# Patient Record
Sex: MALE | Race: WHITE | NOT HISPANIC OR LATINO | Employment: STUDENT | URBAN - METROPOLITAN AREA
[De-identification: names, ages, dates, MRNs, and addresses within clinical notes are randomized per-mention and may not be internally consistent; named-entity substitution may affect disease eponyms.]

---

## 2023-08-21 PROBLEM — R22.0 HEAD LUMP: Status: ACTIVE | Noted: 2023-08-21

## 2023-08-21 PROBLEM — M79.676 TOE PAIN: Status: ACTIVE | Noted: 2023-08-21

## 2023-08-21 PROBLEM — D72.819 LEUKOPENIA: Status: ACTIVE | Noted: 2023-08-21

## 2023-08-21 PROBLEM — F90.9 ATTENTION-DEFICIT/HYPERACTIVITY DISORDER: Status: ACTIVE | Noted: 2023-08-21

## 2023-08-21 PROBLEM — F84.0 AUTISM (HHS-HCC): Status: ACTIVE | Noted: 2023-08-21

## 2023-08-21 PROBLEM — R63.4 WEIGHT LOSS: Status: ACTIVE | Noted: 2023-08-21

## 2023-08-21 PROBLEM — B35.4 TINEA CORPORIS: Status: ACTIVE | Noted: 2023-08-21

## 2023-08-21 PROBLEM — F41.9 ANXIETY DISORDER: Status: ACTIVE | Noted: 2023-08-21

## 2023-08-21 PROBLEM — R19.7 DIARRHEA: Status: ACTIVE | Noted: 2023-08-21

## 2023-08-21 PROBLEM — B36.0 TINEA VERSICOLOR: Status: ACTIVE | Noted: 2023-08-21

## 2023-08-21 PROBLEM — R62.50 DEVELOPMENT DELAY: Status: ACTIVE | Noted: 2023-08-21

## 2023-08-21 RX ORDER — QUETIAPINE FUMARATE 100 MG/1
1 TABLET, FILM COATED ORAL DAILY
COMMUNITY
Start: 2022-01-31 | End: 2024-06-03 | Stop reason: WASHOUT

## 2023-08-21 RX ORDER — TRIAMCINOLONE ACETONIDE 5 MG/G
CREAM TOPICAL
COMMUNITY

## 2023-08-21 RX ORDER — METHYLPHENIDATE HYDROCHLORIDE 30 MG/1
2 CAPSULE, EXTENDED RELEASE ORAL DAILY
COMMUNITY
Start: 2022-10-03 | End: 2024-04-16 | Stop reason: SDUPTHER

## 2023-08-21 RX ORDER — QUETIAPINE FUMARATE 200 MG/1
2 TABLET, FILM COATED ORAL DAILY
COMMUNITY
Start: 2019-12-23 | End: 2024-04-18 | Stop reason: WASHOUT

## 2023-08-21 RX ORDER — BENZOYL PEROXIDE 50 MG/ML
LIQUID TOPICAL
COMMUNITY

## 2023-10-02 ENCOUNTER — TELEMEDICINE (OUTPATIENT)
Dept: BEHAVIORAL HEALTH | Facility: CLINIC | Age: 18
End: 2023-10-02
Payer: COMMERCIAL

## 2023-10-02 DIAGNOSIS — F90.2 ATTENTION DEFICIT HYPERACTIVITY DISORDER (ADHD), COMBINED TYPE: ICD-10-CM

## 2023-10-02 DIAGNOSIS — F34.89 OTHER SPECIFIED PERSISTENT MOOD DISORDERS (CMS-HCC): ICD-10-CM

## 2023-10-02 PROCEDURE — 99214 OFFICE O/P EST MOD 30 MIN: CPT | Performed by: PSYCHIATRY & NEUROLOGY

## 2023-10-02 RX ORDER — METHYLPHENIDATE HYDROCHLORIDE 60 MG/1
60 CAPSULE, EXTENDED RELEASE ORAL EVERY MORNING
Qty: 30 CAPSULE | Refills: 0 | Status: SHIPPED | OUTPATIENT
Start: 2023-12-01 | End: 2024-01-29

## 2023-10-02 RX ORDER — METHYLPHENIDATE HYDROCHLORIDE 60 MG/1
60 CAPSULE, EXTENDED RELEASE ORAL EVERY MORNING
Qty: 30 CAPSULE | Refills: 0 | Status: SHIPPED | OUTPATIENT
Start: 2023-11-01 | End: 2024-04-16 | Stop reason: SDUPTHER

## 2023-10-02 RX ORDER — QUETIAPINE FUMARATE 100 MG/1
100 TABLET, FILM COATED ORAL DAILY
Qty: 90 TABLET | Refills: 1 | Status: SHIPPED | OUTPATIENT
Start: 2023-10-02 | End: 2024-03-15

## 2023-10-02 RX ORDER — QUETIAPINE FUMARATE 200 MG/1
200 TABLET, FILM COATED ORAL NIGHTLY
Qty: 90 TABLET | Refills: 1 | Status: SHIPPED | OUTPATIENT
Start: 2023-10-02 | End: 2024-04-16 | Stop reason: SDUPTHER

## 2023-10-02 RX ORDER — METHYLPHENIDATE HYDROCHLORIDE 60 MG/1
60 CAPSULE, EXTENDED RELEASE ORAL EVERY MORNING
Qty: 30 CAPSULE | Refills: 0 | Status: SHIPPED | OUTPATIENT
Start: 2023-10-02 | End: 2024-06-03 | Stop reason: SDUPTHER

## 2023-10-02 RX ORDER — TRAZODONE HYDROCHLORIDE 50 MG/1
50 TABLET ORAL NIGHTLY
Qty: 30 TABLET | Refills: 2 | Status: SHIPPED | OUTPATIENT
Start: 2023-10-02 | End: 2023-12-29

## 2023-10-02 NOTE — PROGRESS NOTES
"      Patient seen by me, medications and chart history reviewed.     Subjective: (reported issues and events over the last 24 hours)   Mom getting  - some stress related to that  Pt. Doing well despite stressors.  Only taking 1 of the 200 mg of seroquel most nights - reports its better (occasionally reporting \"seeing people\" - grandmother who passed away; doesn't act on those things)  Unisom somewhat helpful with sleep  Mom takes trazodone - helpful for her chronic insomnia  Some more anxiety in morning/afternoon - may be related to time of year    Objective:     Virtual visit    Unable to see pt. - pt. At school work program    Lab Results:   Results for orders placed or performed in visit on 04/08/22   Drug Screen, Urine With Reflex to Confirmation   Result Value Ref Range    DRUG SCREEN COMMENT URINE SEE BELOW     Amphetamine Screen, Urine PRESUMPTIVE NEGATIVE NEGATIVE    Barbiturate Screen, Urine PRESUMPTIVE NEGATIVE NEGATIVE    BENZODIAZEPINE (PRESENCE) IN URINE BY SCREEN METHOD PRESUMPTIVE NEGATIVE NEGATIVE    Cannabinoid Screen, Urine PRESUMPTIVE NEGATIVE NEGATIVE    Cocaine Screen, Urine PRESUMPTIVE NEGATIVE NEGATIVE    Fentanyl, Ur PRESUMPTIVE NEGATIVE NEGATIVE    Methadone Screen, Urine PRESUMPTIVE NEGATIVE NEGATIVE    Opiate Screen, Urine PRESUMPTIVE NEGATIVE NEGATIVE    Oxycodone Screen, Ur PRESUMPTIVE NEGATIVE NEGATIVE    PCP Screen, Urine PRESUMPTIVE NEGATIVE NEGATIVE       Medications:   Current Outpatient Medications   Medication Sig Dispense Refill    benzoyl peroxide 5 % external wash Apply topically.      methylphenidate CD (Metadate CD) 30 mg daily capsule Take 2 capsules (60 mg) by mouth once daily.      QUEtiapine (SEROquel) 100 mg tablet Take 1 tablet (100 mg) by mouth once daily.      QUEtiapine (SEROquel) 200 mg tablet Take 2 tablets (400 mg) by mouth once daily.      triamcinolone (Kenalog) 0.5 % cream Apply topically.       No current facility-administered medications for this " "visit.        Diagnosis/Assessment/Plan:   Dx: ADHD  Austim, requires support  Anxiety/unspecified  Medication Issues: No ADRs   - cont. MPH CD 60 mg (tried \"new dose\" 60 mg) - if not, do 30 mg 2 tabs daily  - add trazodone 50 mg bedtime  - cont. Seroquel 100 mg AM and 200 mg bedtime            "

## 2024-01-25 DIAGNOSIS — F90.2 ATTENTION DEFICIT HYPERACTIVITY DISORDER (ADHD), COMBINED TYPE: ICD-10-CM

## 2024-01-26 ENCOUNTER — TELEPHONE (OUTPATIENT)
Dept: BEHAVIORAL HEALTH | Facility: CLINIC | Age: 19
End: 2024-01-26
Payer: COMMERCIAL

## 2024-01-26 DIAGNOSIS — F90.2 ATTENTION DEFICIT HYPERACTIVITY DISORDER (ADHD), COMBINED TYPE: ICD-10-CM

## 2024-01-29 RX ORDER — METHYLPHENIDATE HYDROCHLORIDE 60 MG/1
60 CAPSULE, EXTENDED RELEASE ORAL EVERY MORNING
Qty: 90 CAPSULE | Refills: 0 | Status: SHIPPED | OUTPATIENT
Start: 2024-01-29 | End: 2024-06-03 | Stop reason: SDUPTHER

## 2024-03-15 DIAGNOSIS — F34.89 OTHER SPECIFIED PERSISTENT MOOD DISORDERS (CMS-HCC): ICD-10-CM

## 2024-03-15 RX ORDER — QUETIAPINE FUMARATE 100 MG/1
100 TABLET, FILM COATED ORAL DAILY
Qty: 90 TABLET | Refills: 1 | Status: SHIPPED | OUTPATIENT
Start: 2024-03-15 | End: 2024-04-16 | Stop reason: SDUPTHER

## 2024-03-18 ENCOUNTER — TELEMEDICINE (OUTPATIENT)
Dept: BEHAVIORAL HEALTH | Facility: CLINIC | Age: 19
End: 2024-03-18
Payer: COMMERCIAL

## 2024-03-18 DIAGNOSIS — F90.2 ATTENTION DEFICIT HYPERACTIVITY DISORDER (ADHD), COMBINED TYPE: ICD-10-CM

## 2024-03-18 PROCEDURE — 99214 OFFICE O/P EST MOD 30 MIN: CPT | Performed by: PSYCHIATRY & NEUROLOGY

## 2024-03-18 RX ORDER — METHYLPHENIDATE HYDROCHLORIDE 40 MG/1
40 CAPSULE ORAL NIGHTLY
Qty: 30 CAPSULE | Refills: 0 | Status: SHIPPED | OUTPATIENT
Start: 2024-03-18 | End: 2024-04-01

## 2024-03-18 NOTE — PROGRESS NOTES
Patient Name: Kevin    Type of Service: Virtual   Date: 3/18/2024             Kevin presents for follow up for   Chief Complaint   Patient presents with    ADHD    Autism      - more impulsive lately - verbally and then yelling/arguing with mom and grandma  - sleeping well  - working to get pt. Job - board of DD involved, supportive  - grandpa passed away; mom and pt. Have moved in with grandma    [place] reviewed, no concerns noted. Reviewed risks, benefits controlled substances.     - only saw mom    Mental Status Exam:  - only saw mom, pt. Not on appointment      Assessment:  Problem List Items Addressed This Visit          Mental Health    Attention-deficit/hyperactivity disorder    Relevant Medications    methylphenidate HCl (Jornay PM) 40 mg 24 hour capsule    Other Relevant Orders    Follow Up In Pediatric Psychiatry        Plan:  - switch to Jornay to target impulsive ADHD Sx  - cont. Trazodone  - cont. Seroquel for mood stabilization         Next appointment: June in person      
Clear

## 2024-04-01 ENCOUNTER — TELEPHONE (OUTPATIENT)
Dept: BEHAVIORAL HEALTH | Facility: CLINIC | Age: 19
End: 2024-04-01
Payer: COMMERCIAL

## 2024-04-01 DIAGNOSIS — F90.2 ATTENTION DEFICIT HYPERACTIVITY DISORDER (ADHD), COMBINED TYPE: ICD-10-CM

## 2024-04-01 DIAGNOSIS — F90.2 ADHD (ATTENTION DEFICIT HYPERACTIVITY DISORDER), COMBINED TYPE: Primary | ICD-10-CM

## 2024-04-01 RX ORDER — METHYLPHENIDATE HYDROCHLORIDE 60 MG/1
60 CAPSULE, EXTENDED RELEASE ORAL EVERY MORNING
Qty: 90 CAPSULE | Refills: 0 | Status: CANCELLED | OUTPATIENT
Start: 2024-04-01 | End: 2024-06-30

## 2024-04-01 RX ORDER — METHYLPHENIDATE HYDROCHLORIDE 60 MG/1
60 CAPSULE, EXTENDED RELEASE ORAL EVERY MORNING
Qty: 30 CAPSULE | Refills: 0 | Status: SHIPPED | OUTPATIENT
Start: 2024-04-01 | End: 2024-06-03 | Stop reason: SDUPTHER

## 2024-04-14 DIAGNOSIS — F34.89 OTHER SPECIFIED PERSISTENT MOOD DISORDERS (CMS-HCC): ICD-10-CM

## 2024-04-15 DIAGNOSIS — F90.2 ATTENTION DEFICIT HYPERACTIVITY DISORDER (ADHD), COMBINED TYPE: Primary | ICD-10-CM

## 2024-04-15 DIAGNOSIS — F34.89 OTHER SPECIFIED PERSISTENT MOOD DISORDERS (CMS-HCC): ICD-10-CM

## 2024-04-15 RX ORDER — TRAZODONE HYDROCHLORIDE 50 MG/1
50 TABLET ORAL EVERY EVENING
Qty: 30 TABLET | Refills: 0 | OUTPATIENT
Start: 2024-04-15

## 2024-04-15 NOTE — TELEPHONE ENCOUNTER
Duplicate   Resourceful/Self-reliant Assertive/Resourceful/Self-reliant Assertive/Resourceful/Self-reliant

## 2024-04-16 RX ORDER — QUETIAPINE FUMARATE 200 MG/1
200 TABLET, FILM COATED ORAL NIGHTLY
Qty: 90 TABLET | Refills: 1 | Status: SHIPPED | OUTPATIENT
Start: 2024-04-16 | End: 2024-04-18 | Stop reason: WASHOUT

## 2024-04-16 RX ORDER — METHYLPHENIDATE HYDROCHLORIDE 30 MG/1
30 CAPSULE, EXTENDED RELEASE ORAL DAILY
Qty: 30 CAPSULE | Refills: 0 | Status: SHIPPED | OUTPATIENT
Start: 2024-04-16 | End: 2024-06-03 | Stop reason: SINTOL

## 2024-04-16 RX ORDER — TRAZODONE HYDROCHLORIDE 50 MG/1
50 TABLET ORAL NIGHTLY
Qty: 90 TABLET | Refills: 2 | Status: SHIPPED | OUTPATIENT
Start: 2024-04-16 | End: 2024-06-03 | Stop reason: SDUPTHER

## 2024-04-16 RX ORDER — QUETIAPINE FUMARATE 100 MG/1
100 TABLET, FILM COATED ORAL DAILY
Qty: 90 TABLET | Refills: 1 | Status: SHIPPED | OUTPATIENT
Start: 2024-04-16 | End: 2024-06-03 | Stop reason: SDUPTHER

## 2024-04-16 RX ORDER — METHYLPHENIDATE HYDROCHLORIDE 60 MG/1
60 CAPSULE, EXTENDED RELEASE ORAL EVERY MORNING
Qty: 30 CAPSULE | Refills: 0 | Status: SHIPPED | OUTPATIENT
Start: 2024-04-16 | End: 2024-06-03 | Stop reason: SDUPTHER

## 2024-06-03 ENCOUNTER — OFFICE VISIT (OUTPATIENT)
Dept: BEHAVIORAL HEALTH | Facility: CLINIC | Age: 19
End: 2024-06-03
Payer: COMMERCIAL

## 2024-06-03 VITALS
TEMPERATURE: 97.3 F | SYSTOLIC BLOOD PRESSURE: 144 MMHG | HEIGHT: 66 IN | WEIGHT: 124.25 LBS | DIASTOLIC BLOOD PRESSURE: 82 MMHG | HEART RATE: 105 BPM | BODY MASS INDEX: 19.97 KG/M2

## 2024-06-03 DIAGNOSIS — F34.89 OTHER SPECIFIED PERSISTENT MOOD DISORDERS (CMS-HCC): ICD-10-CM

## 2024-06-03 DIAGNOSIS — F84.0 AUTISM (HHS-HCC): ICD-10-CM

## 2024-06-03 DIAGNOSIS — F90.2 ADHD (ATTENTION DEFICIT HYPERACTIVITY DISORDER), COMBINED TYPE: ICD-10-CM

## 2024-06-03 DIAGNOSIS — F90.2 ATTENTION DEFICIT HYPERACTIVITY DISORDER (ADHD), COMBINED TYPE: ICD-10-CM

## 2024-06-03 PROCEDURE — 99214 OFFICE O/P EST MOD 30 MIN: CPT | Performed by: PSYCHIATRY & NEUROLOGY

## 2024-06-03 RX ORDER — METHYLPHENIDATE HYDROCHLORIDE 60 MG/1
60 CAPSULE, EXTENDED RELEASE ORAL EVERY MORNING
Qty: 30 CAPSULE | Refills: 0 | Status: SHIPPED | OUTPATIENT
Start: 2024-07-01 | End: 2024-07-31

## 2024-06-03 RX ORDER — QUETIAPINE FUMARATE 100 MG/1
100 TABLET, FILM COATED ORAL DAILY
Qty: 90 TABLET | Refills: 2 | Status: SHIPPED | OUTPATIENT
Start: 2024-06-03

## 2024-06-03 RX ORDER — METHYLPHENIDATE HYDROCHLORIDE 60 MG/1
60 CAPSULE, EXTENDED RELEASE ORAL EVERY MORNING
Qty: 30 CAPSULE | Refills: 0 | Status: SHIPPED | OUTPATIENT
Start: 2024-07-29 | End: 2024-08-28

## 2024-06-03 RX ORDER — METHYLPHENIDATE HYDROCHLORIDE 60 MG/1
60 CAPSULE, EXTENDED RELEASE ORAL EVERY MORNING
Qty: 90 CAPSULE | Refills: 0 | Status: SHIPPED | OUTPATIENT
Start: 2024-06-03 | End: 2024-09-01

## 2024-06-03 RX ORDER — TRAZODONE HYDROCHLORIDE 50 MG/1
50 TABLET ORAL NIGHTLY
Qty: 90 TABLET | Refills: 2 | Status: SHIPPED | OUTPATIENT
Start: 2024-06-03

## 2024-06-03 RX ORDER — METHYLPHENIDATE HYDROCHLORIDE 60 MG/1
60 CAPSULE, EXTENDED RELEASE ORAL EVERY MORNING
Qty: 90 CAPSULE | Refills: 0 | Status: SHIPPED | OUTPATIENT
Start: 2024-09-02 | End: 2024-12-01

## 2024-06-03 RX ORDER — METHYLPHENIDATE HYDROCHLORIDE 60 MG/1
60 CAPSULE, EXTENDED RELEASE ORAL EVERY MORNING
Qty: 30 CAPSULE | Refills: 0 | Status: SHIPPED | OUTPATIENT
Start: 2024-06-03 | End: 2024-07-03

## 2024-06-03 NOTE — PROGRESS NOTES
"Group:    Patient Name: Kevin    Type of Service: In Office   Date: 6/3/2024             Kevin presents for follow up for   Chief Complaint   Patient presents with    ADHD    Anxiety      - some ongoing challenges with impulsivity - george verbally  - added 30 mg Metadate CD - patient did not tolerate - did not sleep; did not help with impulsivity  - hard to remember AM Metadate CD    Mental Status Exam:  General Appearance: Well groomed, appropriate eye contact.  Attitude/Behavior: Cooperative, conversant, engaged, and with good eye contact.  Motor: No psychomotor agitation or retardation, no tremor or other abnormal movements.  Speech: Normal rate, volume, prosody  Mood: \"okay\"  Affect: Constricted  Thought Process: Linear, goal directed; concrete  Thought Associations: No loosening of associations  Thought Content: normal  Perception: No perceptual abnormalities noted  Sensorium: Alert and oriented to person, place, time and situation  Insight: Limited, unchanged  Judgment: Fair  Cognition: Cognitively intact to conversational testing with respect to attention, orientation, fund of knowledge, recent and remote memory, and language.      Assessment:  Problem List Items Addressed This Visit          Mental Health    Attention-deficit/hyperactivity disorder    Relevant Medications    methylphenidate CD (Metadate CD) 60 mg daily capsule    methylphenidate CD (Metadate CD) 60 mg daily capsule (Start on 9/2/2024)    Other Relevant Orders    Drug Screen, Urine With Reflex to Confirmation    CBC    Renal function panel       Neuro    Autism ([facility])     Other Visit Diagnoses       Other specified persistent mood disorders (CMS-formerly [facility])        Relevant Medications    QUEtiapine (SEROquel) 100 mg tablet    traZODone (Desyrel) 50 mg tablet             Plan:  - Jornay PM not covered  - can consider Daytrana patch in future  - cont. Current meds  - support pt. And mom and increasing independence      Next appointment: Oct       "

## 2024-08-29 DIAGNOSIS — F90.2 ATTENTION DEFICIT HYPERACTIVITY DISORDER (ADHD), COMBINED TYPE: ICD-10-CM

## 2024-08-30 RX ORDER — METHYLPHENIDATE HYDROCHLORIDE 60 MG/1
60 CAPSULE, EXTENDED RELEASE ORAL EVERY MORNING
Qty: 90 CAPSULE | Refills: 0 | Status: SHIPPED | OUTPATIENT
Start: 2024-08-30 | End: 2024-11-28

## 2024-09-14 ENCOUNTER — LAB (OUTPATIENT)
Dept: LAB | Facility: LAB | Age: 19
End: 2024-09-14
Payer: COMMERCIAL

## 2024-09-14 DIAGNOSIS — F90.2 ATTENTION DEFICIT HYPERACTIVITY DISORDER (ADHD), COMBINED TYPE: ICD-10-CM

## 2024-09-14 PROCEDURE — 36415 COLL VENOUS BLD VENIPUNCTURE: CPT

## 2024-09-14 PROCEDURE — 80069 RENAL FUNCTION PANEL: CPT

## 2024-09-14 PROCEDURE — 85027 COMPLETE CBC AUTOMATED: CPT

## 2024-09-14 PROCEDURE — 80307 DRUG TEST PRSMV CHEM ANLYZR: CPT

## 2024-09-15 LAB
ALBUMIN SERPL BCP-MCNC: 5.1 G/DL (ref 3.4–5)
AMPHETAMINES UR QL SCN: NORMAL
ANION GAP SERPL CALC-SCNC: 14 MMOL/L (ref 10–20)
BARBITURATES UR QL SCN: NORMAL
BENZODIAZ UR QL SCN: NORMAL
BUN SERPL-MCNC: 16 MG/DL (ref 6–23)
BZE UR QL SCN: NORMAL
CALCIUM SERPL-MCNC: 9.9 MG/DL (ref 8.6–10.6)
CANNABINOIDS UR QL SCN: NORMAL
CHLORIDE SERPL-SCNC: 103 MMOL/L (ref 98–107)
CO2 SERPL-SCNC: 27 MMOL/L (ref 21–32)
CREAT SERPL-MCNC: 1.14 MG/DL (ref 0.5–1.3)
EGFRCR SERPLBLD CKD-EPI 2021: >90 ML/MIN/1.73M*2
ERYTHROCYTE [DISTWIDTH] IN BLOOD BY AUTOMATED COUNT: 11.8 % (ref 11.5–14.5)
FENTANYL+NORFENTANYL UR QL SCN: NORMAL
GLUCOSE SERPL-MCNC: 102 MG/DL (ref 74–99)
HCT VFR BLD AUTO: 46.8 % (ref 41–52)
HGB BLD-MCNC: 15.3 G/DL (ref 13.5–17.5)
MCH RBC QN AUTO: 28 PG (ref 26–34)
MCHC RBC AUTO-ENTMCNC: 32.7 G/DL (ref 32–36)
MCV RBC AUTO: 86 FL (ref 80–100)
METHADONE UR QL SCN: NORMAL
NRBC BLD-RTO: 0 /100 WBCS (ref 0–0)
OPIATES UR QL SCN: NORMAL
OXYCODONE+OXYMORPHONE UR QL SCN: NORMAL
PCP UR QL SCN: NORMAL
PHOSPHATE SERPL-MCNC: 2.9 MG/DL (ref 2.5–4.9)
PLATELET # BLD AUTO: 154 X10*3/UL (ref 150–450)
POTASSIUM SERPL-SCNC: 4 MMOL/L (ref 3.5–5.3)
RBC # BLD AUTO: 5.47 X10*6/UL (ref 4.5–5.9)
SODIUM SERPL-SCNC: 140 MMOL/L (ref 136–145)
WBC # BLD AUTO: 3.7 X10*3/UL (ref 4.4–11.3)

## 2024-09-16 ENCOUNTER — APPOINTMENT (OUTPATIENT)
Dept: BEHAVIORAL HEALTH | Facility: CLINIC | Age: 19
End: 2024-09-16
Payer: COMMERCIAL

## 2024-09-16 DIAGNOSIS — F90.2 ADHD (ATTENTION DEFICIT HYPERACTIVITY DISORDER), COMBINED TYPE: ICD-10-CM

## 2024-09-16 DIAGNOSIS — F34.89 OTHER SPECIFIED PERSISTENT MOOD DISORDERS (CMS-HCC): ICD-10-CM

## 2024-09-16 DIAGNOSIS — F90.2 ATTENTION DEFICIT HYPERACTIVITY DISORDER (ADHD), COMBINED TYPE: ICD-10-CM

## 2024-09-16 DIAGNOSIS — F84.0 AUTISM (HHS-HCC): ICD-10-CM

## 2024-09-16 PROCEDURE — 99213 OFFICE O/P EST LOW 20 MIN: CPT | Performed by: PSYCHIATRY & NEUROLOGY

## 2024-09-16 RX ORDER — TRAZODONE HYDROCHLORIDE 50 MG/1
50 TABLET ORAL NIGHTLY
Qty: 90 TABLET | Refills: 2 | Status: SHIPPED | OUTPATIENT
Start: 2024-09-16

## 2024-09-16 RX ORDER — METHYLPHENIDATE HYDROCHLORIDE 60 MG/1
60 CAPSULE, EXTENDED RELEASE ORAL EVERY MORNING
Qty: 30 CAPSULE | Refills: 0 | Status: SHIPPED | OUTPATIENT
Start: 2024-10-14 | End: 2024-11-13

## 2024-09-16 RX ORDER — QUETIAPINE FUMARATE 100 MG/1
100 TABLET, FILM COATED ORAL DAILY
Qty: 90 TABLET | Refills: 2 | Status: SHIPPED | OUTPATIENT
Start: 2024-09-16

## 2024-09-16 RX ORDER — METHYLPHENIDATE HYDROCHLORIDE 60 MG/1
60 CAPSULE, EXTENDED RELEASE ORAL EVERY MORNING
Qty: 30 CAPSULE | Refills: 0 | Status: SHIPPED | OUTPATIENT
Start: 2024-11-11 | End: 2025-02-09

## 2024-09-16 RX ORDER — METHYLPHENIDATE HYDROCHLORIDE 60 MG/1
60 CAPSULE, EXTENDED RELEASE ORAL EVERY MORNING
Qty: 30 CAPSULE | Refills: 0 | Status: SHIPPED | OUTPATIENT
Start: 2024-09-16 | End: 2024-10-16

## 2024-09-16 NOTE — PROGRESS NOTES
"Outpatient Child and Adolescent Psychiatry Follow-Up    Kevin Mayorga is a 18 y.o. male (assigned male at birth) presenting for psychiatric follow-up.   Patient evaluated 9.16.24   Virtual Consent:   An interactive audio and video telecommunication system which permits real time communications between the patient (at the originating site) and provider (at the distant site) was utilized to provide this telehealth service.   saw mom only - pt. at work    Chief Complaint:  ADHD and Med Management    HPI:   Patient last evaluated 6.4.24 in person, at which time trying to get alternatives to Metadate covered - Jornay not covered.       Subjective   - slowly working on some independence - has tried driving a bit  - stays with grandma  - has a DD worker; connects every 3-4 months  - mom not interested in getting guardianship - feels that patient will eventually be able to handle things on his own  - doing okay, stable overall    Management thoughts:   can consider other stimulant options in future     Objective   Mental Status Exam: pt. with grandma working at her house    Vitals:   There were no vitals filed for this visit.      2/3/2020     2:05 PM 1/31/2022     3:05 PM 6/3/2024     2:16 PM   Vitals   Systolic 127 115 144   Diastolic 76 66 82   Heart Rate 100 91 105   Temp  36.8 °C (98.3 °F) 36.3 °C (97.3 °F)   Height (in)   1.664 m (5' 5.5\")   Weight (lb) 109.4 110.6 124.25   BMI   20.36 kg/m2   BSA (m2)   1.61 m2   Visit Report   Report      Current Medications:    Current Outpatient Medications:     benzoyl peroxide 5 % external wash, Apply topically., Disp: , Rfl:     methylphenidate CD (Metadate CD) 60 mg daily capsule, Take 1 capsule (60 mg) by mouth once daily in the morning. Do not crush or chew. Do not fill before September 2, 2024., Disp: 90 capsule, Rfl: 0    methylphenidate CD (Metadate CD) 60 mg daily capsule, Take 1 capsule (60 mg) by mouth once daily in the morning. Do not crush or chew., Disp: 30 capsule, " Rfl: 0    methylphenidate CD (Metadate CD) 60 mg daily capsule, Take 1 capsule (60 mg) by mouth once daily in the morning. Take with 30 mg capsule Do not fill before July 1, 2024., Disp: 30 capsule, Rfl: 0    methylphenidate CD (Metadate CD) 60 mg daily capsule, Take 1 capsule (60 mg) by mouth once daily in the morning. Do not crush or chew. Do not fill before July 29, 2024., Disp: 30 capsule, Rfl: 0    methylphenidate CD (Metadate CD) 60 mg daily capsule, Take 1 capsule (60 mg) by mouth once daily in the morning., Disp: 90 capsule, Rfl: 0    QUEtiapine (SEROquel) 100 mg tablet, Take 1 tablet (100 mg) by mouth once daily., Disp: 90 tablet, Rfl: 2    traZODone (Desyrel) 50 mg tablet, Take 1 tablet (50 mg) by mouth once daily at bedtime., Disp: 90 tablet, Rfl: 2    triamcinolone (Kenalog) 0.5 % cream, Apply topically., Disp: , Rfl:      Assessment:   Kevin Mayorga is a 18 y.o. male (assigned male at birth) presenting for follow-up.     Diagnosis: stable  1. Other specified persistent mood disorders (CMS-[facility])  Renal function panel    CBC      2. Attention deficit hyperactivity disorder (ADHD), combined type        3. Autism ([facility])          Assessment/Plan   As of 09/16/2024:   cont. Metadate - consider alternatives in future    Follow-up:   Follow up 1.6.25 330 PM sooner as needed.   Most recent in-person visit 6.24      Bree Ballard MD

## 2025-01-06 ENCOUNTER — APPOINTMENT (OUTPATIENT)
Dept: BEHAVIORAL HEALTH | Facility: CLINIC | Age: 20
End: 2025-01-06
Payer: COMMERCIAL

## 2025-01-06 DIAGNOSIS — F84.0 AUTISM (HHS-HCC): ICD-10-CM

## 2025-01-06 DIAGNOSIS — F34.89 OTHER SPECIFIED PERSISTENT MOOD DISORDERS (CMS-HCC): ICD-10-CM

## 2025-01-06 DIAGNOSIS — F90.2 ADHD (ATTENTION DEFICIT HYPERACTIVITY DISORDER), COMBINED TYPE: ICD-10-CM

## 2025-01-06 DIAGNOSIS — F90.2 ATTENTION DEFICIT HYPERACTIVITY DISORDER (ADHD), COMBINED TYPE: ICD-10-CM

## 2025-01-06 PROCEDURE — 99214 OFFICE O/P EST MOD 30 MIN: CPT | Performed by: PSYCHIATRY & NEUROLOGY

## 2025-01-06 RX ORDER — QUETIAPINE FUMARATE 100 MG/1
100 TABLET, FILM COATED ORAL DAILY
Qty: 90 TABLET | Refills: 2 | Status: SHIPPED | OUTPATIENT
Start: 2025-01-06

## 2025-01-06 RX ORDER — TRAZODONE HYDROCHLORIDE 50 MG/1
50 TABLET ORAL NIGHTLY
Qty: 90 TABLET | Refills: 2 | Status: SHIPPED | OUTPATIENT
Start: 2025-01-06

## 2025-01-06 RX ORDER — METHYLPHENIDATE HYDROCHLORIDE 60 MG/1
60 CAPSULE, EXTENDED RELEASE ORAL EVERY MORNING
Qty: 30 CAPSULE | Refills: 0 | Status: SHIPPED | OUTPATIENT
Start: 2025-02-03 | End: 2025-03-05

## 2025-01-06 RX ORDER — METHYLPHENIDATE HYDROCHLORIDE 60 MG/1
60 CAPSULE, EXTENDED RELEASE ORAL EVERY MORNING
Qty: 30 CAPSULE | Refills: 0 | Status: SHIPPED | OUTPATIENT
Start: 2025-01-06 | End: 2025-04-06

## 2025-01-06 RX ORDER — METHYLPHENIDATE HYDROCHLORIDE 60 MG/1
60 CAPSULE, EXTENDED RELEASE ORAL EVERY MORNING
Qty: 30 CAPSULE | Refills: 0 | Status: SHIPPED | OUTPATIENT
Start: 2025-03-03 | End: 2025-04-02

## 2025-01-06 NOTE — PROGRESS NOTES
"Outpatient Child and Adolescent Psychiatry Follow-Up    Kevin Mayorga is a 19 y.o. male (assigned male at birth) presenting for psychiatric follow-up.   met with mom - pt. at home    Chief Complaint:  ADHD and Med Management    HPI:   Patient last evaluated 9.16.24, at which time was overall stable.       Subjective   - slowly working on some independence - cont. with limited activities/independence  - stays with grandma (mom and pt. live with grandma)  - cont. limited day time activities - mostly stays on the phone  - has a [employer] worker; connects every 3-4 months  - mom not interested in getting guardianship - feels that patient will eventually be able to handle things on his own  - at home is in charge of trash; takes care of laundry    Management thoughts:   can consider other stimulant options in future       Vitals:   There were no vitals filed for this visit.      2/3/2020     2:05 PM 1/31/2022     3:05 PM 6/3/2024     2:16 PM   Vitals   Systolic 127 115 144   Diastolic 76 66 82   Heart Rate 100 91 105   Temp  36.8 °C (98.3 °F) 36.3 °C (97.3 °F)   Height   1.664 m (5' 5.5\")   Weight (lb) 109.4 110.6 124.25   BMI   20.36 kg/m2   BSA (m2)   1.61 m2   Visit Report   Report      Current Medications:    Current Outpatient Medications:     benzoyl peroxide 5 % external wash, Apply topically., Disp: , Rfl:     methylphenidate CD (Metadate CD) 60 mg daily capsule, Take 1 capsule (60 mg) by mouth once daily in the morning. Take with 30 mg capsule Do not fill before October 14, 2024., Disp: 30 capsule, Rfl: 0    methylphenidate CD (Metadate CD) 60 mg daily capsule, Take 1 capsule (60 mg) by mouth once daily in the morning. Do not crush or chew. Do not fill before November 11, 2024., Disp: 30 capsule, Rfl: 0    methylphenidate CD (Metadate CD) 60 mg daily capsule, Take 1 capsule (60 mg) by mouth once daily in the morning., Disp: 30 capsule, Rfl: 0    [START ON 2/3/2025] methylphenidate CD (Metadate CD) 60 mg daily capsule, " Take 1 capsule (60 mg) by mouth once daily in the morning. Do not crush or chew. Do not fill before February 3, 2025., Disp: 30 capsule, Rfl: 0    [START ON 3/3/2025] methylphenidate CD (Metadate CD) 60 mg daily capsule, Take 1 capsule (60 mg) by mouth once daily in the morning. Do not crush or chew. Do not fill before March 3, 2025., Disp: 30 capsule, Rfl: 0    QUEtiapine (SEROquel) 100 mg tablet, Take 1 tablet (100 mg) by mouth once daily., Disp: 90 tablet, Rfl: 2    traZODone (Desyrel) 50 mg tablet, Take 1 tablet (50 mg) by mouth once daily at bedtime., Disp: 90 tablet, Rfl: 2    triamcinolone (Kenalog) 0.5 % cream, Apply topically., Disp: , Rfl:      Assessment:   Kevin Mayorga is a 19 y.o. male (assigned male at birth) presenting for follow-up.     Diagnosis: stable  1. Other specified persistent mood disorders (CMS-HCC)  Follow Up In Pediatric Psychiatry    QUEtiapine (SEROquel) 100 mg tablet    traZODone (Desyrel) 50 mg tablet    Follow Up In Pediatric Psychiatry      2. Attention deficit hyperactivity disorder (ADHD), combined type  Follow Up In Pediatric Psychiatry    methylphenidate CD (Metadate CD) 60 mg daily capsule    methylphenidate CD (Metadate CD) 60 mg daily capsule    Follow Up In Pediatric Psychiatry      3. Autism ([facility])  Follow Up In Pediatric Psychiatry    Follow Up In Pediatric Psychiatry      4. ADHD (attention deficit hyperactivity disorder), combined type  Follow Up In Pediatric Psychiatry    methylphenidate CD (Metadate CD) 60 mg daily capsule    Follow Up In Pediatric Psychiatry        Assessment/Plan   As of 01/06/2025:   cont. current meds  encourage increased demands on pt.'s activity  see pt. in person for next visit    Follow-up:   Follow up 5.2025 or sooner as needed.   Most recent in-person visit 6.24        Bree Ballard MD

## 2025-02-24 ENCOUNTER — TELEPHONE (OUTPATIENT)
Dept: BEHAVIORAL HEALTH | Facility: CLINIC | Age: 20
End: 2025-02-24
Payer: COMMERCIAL

## 2025-02-24 DIAGNOSIS — F90.2 ADHD (ATTENTION DEFICIT HYPERACTIVITY DISORDER), COMBINED TYPE: ICD-10-CM

## 2025-02-24 RX ORDER — METHYLPHENIDATE HYDROCHLORIDE 60 MG/1
60 CAPSULE, EXTENDED RELEASE ORAL EVERY MORNING
Qty: 30 CAPSULE | Refills: 0 | Status: CANCELLED | OUTPATIENT
Start: 2025-02-24 | End: 2025-03-26

## 2025-02-25 DIAGNOSIS — F90.2 ATTENTION DEFICIT HYPERACTIVITY DISORDER (ADHD), COMBINED TYPE: ICD-10-CM

## 2025-02-25 RX ORDER — METHYLPHENIDATE HYDROCHLORIDE 60 MG/1
60 CAPSULE, EXTENDED RELEASE ORAL EVERY MORNING
Qty: 10 CAPSULE | Refills: 0 | Status: SHIPPED | OUTPATIENT
Start: 2025-02-25 | End: 2025-03-07

## 2025-02-28 ENCOUNTER — TELEPHONE (OUTPATIENT)
Dept: BEHAVIORAL HEALTH | Facility: CLINIC | Age: 20
End: 2025-02-28
Payer: COMMERCIAL

## 2025-02-28 DIAGNOSIS — F90.2 ADHD (ATTENTION DEFICIT HYPERACTIVITY DISORDER), COMBINED TYPE: Primary | ICD-10-CM

## 2025-02-28 RX ORDER — METHYLPHENIDATE HYDROCHLORIDE 50 MG/1
50 CAPSULE, EXTENDED RELEASE ORAL EVERY MORNING
Qty: 30 CAPSULE | Refills: 0 | Status: CANCELLED | OUTPATIENT
Start: 2025-02-28 | End: 2025-03-30

## 2025-02-28 RX ORDER — DEXMETHYLPHENIDATE HYDROCHLORIDE 40 MG/1
40 CAPSULE, EXTENDED RELEASE ORAL DAILY
Qty: 30 CAPSULE | Refills: 0 | Status: SHIPPED | OUTPATIENT
Start: 2025-02-28 | End: 2025-03-30

## 2025-03-26 DIAGNOSIS — F90.2 ADHD (ATTENTION DEFICIT HYPERACTIVITY DISORDER), COMBINED TYPE: ICD-10-CM

## 2025-03-26 RX ORDER — DEXMETHYLPHENIDATE HYDROCHLORIDE 40 MG/1
40 CAPSULE, EXTENDED RELEASE ORAL DAILY
Qty: 30 CAPSULE | Refills: 0 | Status: SHIPPED | OUTPATIENT
Start: 2025-03-26 | End: 2025-04-25

## 2025-04-16 DIAGNOSIS — F90.2 ADHD (ATTENTION DEFICIT HYPERACTIVITY DISORDER), COMBINED TYPE: ICD-10-CM

## 2025-04-16 DIAGNOSIS — F90.2 ATTENTION DEFICIT HYPERACTIVITY DISORDER (ADHD), COMBINED TYPE: ICD-10-CM

## 2025-04-16 RX ORDER — DEXMETHYLPHENIDATE HYDROCHLORIDE 40 MG/1
40 CAPSULE, EXTENDED RELEASE ORAL DAILY
Qty: 10 CAPSULE | Refills: 0 | Status: SHIPPED | OUTPATIENT
Start: 2025-04-16 | End: 2025-04-26

## 2025-04-16 RX ORDER — DEXMETHYLPHENIDATE HYDROCHLORIDE 40 MG/1
40 CAPSULE, EXTENDED RELEASE ORAL DAILY
Qty: 30 CAPSULE | Refills: 0 | Status: SHIPPED | OUTPATIENT
Start: 2025-04-26 | End: 2025-05-26

## 2025-04-16 NOTE — PROGRESS NOTES
Mom stated that [facility] does not have dexmethylphenidate XR (Focalin XR) 40 mg 24 hr capsule. I asked mom to call other pharmacies and find one who has it in stock and we can send it there.

## 2025-05-05 ENCOUNTER — APPOINTMENT (OUTPATIENT)
Dept: BEHAVIORAL HEALTH | Facility: CLINIC | Age: 20
End: 2025-05-05
Payer: COMMERCIAL

## 2025-05-05 VITALS
HEART RATE: 101 BPM | SYSTOLIC BLOOD PRESSURE: 147 MMHG | TEMPERATURE: 97.2 F | DIASTOLIC BLOOD PRESSURE: 88 MMHG | BODY MASS INDEX: 22.18 KG/M2 | WEIGHT: 133.13 LBS | HEIGHT: 65 IN

## 2025-05-05 DIAGNOSIS — F84.0 AUTISM (HHS-HCC): ICD-10-CM

## 2025-05-05 DIAGNOSIS — F90.2 ADHD (ATTENTION DEFICIT HYPERACTIVITY DISORDER), COMBINED TYPE: ICD-10-CM

## 2025-05-05 DIAGNOSIS — F34.89 OTHER SPECIFIED PERSISTENT MOOD DISORDERS: ICD-10-CM

## 2025-05-05 DIAGNOSIS — F90.2 ATTENTION DEFICIT HYPERACTIVITY DISORDER (ADHD), COMBINED TYPE: ICD-10-CM

## 2025-05-05 PROCEDURE — 99214 OFFICE O/P EST MOD 30 MIN: CPT | Performed by: PSYCHIATRY & NEUROLOGY

## 2025-05-05 PROCEDURE — 3008F BODY MASS INDEX DOCD: CPT | Performed by: PSYCHIATRY & NEUROLOGY

## 2025-05-05 RX ORDER — QUETIAPINE FUMARATE 100 MG/1
150 TABLET, FILM COATED ORAL DAILY
Qty: 90 TABLET | Refills: 2 | Status: SHIPPED | OUTPATIENT
Start: 2025-05-05

## 2025-05-05 RX ORDER — DEXMETHYLPHENIDATE HYDROCHLORIDE 40 MG/1
40 CAPSULE, EXTENDED RELEASE ORAL DAILY
Qty: 30 CAPSULE | Refills: 0 | Status: SHIPPED | OUTPATIENT
Start: 2025-06-02

## 2025-05-05 RX ORDER — QUETIAPINE FUMARATE 100 MG/1
100 TABLET, FILM COATED ORAL DAILY
Qty: 90 TABLET | Refills: 2 | Status: SHIPPED | OUTPATIENT
Start: 2025-05-05 | End: 2025-05-05 | Stop reason: SDUPTHER

## 2025-05-05 RX ORDER — DEXMETHYLPHENIDATE HYDROCHLORIDE 40 MG/1
40 CAPSULE, EXTENDED RELEASE ORAL DAILY
Qty: 30 CAPSULE | Refills: 0 | Status: SHIPPED | OUTPATIENT
Start: 2025-05-05 | End: 2025-06-04

## 2025-05-05 RX ORDER — DEXMETHYLPHENIDATE HYDROCHLORIDE 40 MG/1
40 CAPSULE, EXTENDED RELEASE ORAL DAILY
Qty: 10 CAPSULE | Refills: 0 | Status: SHIPPED | OUTPATIENT
Start: 2025-06-30 | End: 2025-07-10

## 2025-05-05 RX ORDER — TRAZODONE HYDROCHLORIDE 50 MG/1
50 TABLET ORAL NIGHTLY
Qty: 90 TABLET | Refills: 2 | Status: SHIPPED | OUTPATIENT
Start: 2025-05-05 | End: 2025-05-05 | Stop reason: SDUPTHER

## 2025-05-05 RX ORDER — TRAZODONE HYDROCHLORIDE 50 MG/1
50 TABLET ORAL NIGHTLY
Qty: 90 TABLET | Refills: 2 | Status: SHIPPED | OUTPATIENT
Start: 2025-05-05

## 2025-05-05 NOTE — PROGRESS NOTES
"Outpatient Child and Adolescent Psychiatry Follow-Up    Kevin Mayorga is a 19 y.o. male (assigned male at birth) presenting for psychiatric follow-up.   Patient evaluated in person accompanied by mother      Chief Complaint:  ADHD and Med Management    HPI:   Patient last evaluated 1.6.25, at which time pt/family reported interval stability but was having difficulty moving forward with independence.       Subjective   - cont. slowly working on some independence - cont. with limited activities/independence  - stays with grandma (mom and pt. live with grandma)  - does not have drivers' licence - has a couple of options of where to be evaluated  - very unusual sleep schedule - appears decreased sleep overall  - spending most of the day, all day in his room with shades drawn    Management thoughts:   can consider other stimulant options in future  cont. encourage steps towards independence       Vitals:   see vitals attached      2/3/2020     2:05 PM 1/31/2022     3:05 PM 6/3/2024     2:16 PM   Vitals   Systolic 127 115 144   Diastolic 76 66 82   Heart Rate 100 91 105   Temp  36.8 °C (98.3 °F) 36.3 °C (97.3 °F)   Height   1.664 m (5' 5.5\")   Weight (lb) 109.4 110.6 124.25   BMI   20.36 kg/m2   BSA (m2)   1.61 m2   Visit Report   Report      Current Medications:    Current Outpatient Medications:     benzoyl peroxide 5 % external wash, Apply topically., Disp: , Rfl:     methylphenidate CD (Metadate CD) 60 mg daily capsule, Take 1 capsule (60 mg) by mouth once daily in the morning. Take with 30 mg capsule Do not fill before October 14, 2024., Disp: 30 capsule, Rfl: 0    methylphenidate CD (Metadate CD) 60 mg daily capsule, Take 1 capsule (60 mg) by mouth once daily in the morning. Do not crush or chew. Do not fill before November 11, 2024., Disp: 30 capsule, Rfl: 0    methylphenidate CD (Metadate CD) 60 mg daily capsule, Take 1 capsule (60 mg) by mouth once daily in the morning., Disp: 30 capsule, Rfl: 0    [START ON " "2/3/2025] methylphenidate CD (Metadate CD) 60 mg daily capsule, Take 1 capsule (60 mg) by mouth once daily in the morning. Do not crush or chew. Do not fill before February 3, 2025., Disp: 30 capsule, Rfl: 0    [START ON 3/3/2025] methylphenidate CD (Metadate CD) 60 mg daily capsule, Take 1 capsule (60 mg) by mouth once daily in the morning. Do not crush or chew. Do not fill before March 3, 2025., Disp: 30 capsule, Rfl: 0    QUEtiapine (SEROquel) 100 mg tablet, Take 1 tablet (100 mg) by mouth once daily., Disp: 90 tablet, Rfl: 2    traZODone (Desyrel) 50 mg tablet, Take 1 tablet (50 mg) by mouth once daily at bedtime., Disp: 90 tablet, Rfl: 2    triamcinolone (Kenalog) 0.5 % cream, Apply topically., Disp: , Rfl:     Objective   Mental Status Exam:   Patient appropriately groomed, dressed in casual clothes; appearance is consistent with chronological age. Patient is calm and cooperative with appropriate eye contact; no psychomotor agitation or retardation and no EPS/TD noted. Speech with normal rate and rhythm, appropriate volume and tone; spontaneous and fluent. Patient states that mood is \"good\", affect congruent with stated mood and with appropriate range. Thought process is organized, linear, and goal directed with logical associations; patient does not endorse ideation of harm to self or others, does not endorse auditory or visual hallucinations, and does not appear to be responding to internal stimuli. No apparent deficits in memory, attention, concentration or language; fund of knowledge appears adequate for development and education. Insight and judgment are fair.     Vitals:   Vitals:    05/05/25 1531   BP: 147/88   Pulse: 101   Temp: 36.2 °C (97.2 °F)   Weight: 60.4 kg (133 lb 2 oz)   Height: 1.651 m (5' 5\")         2/3/2020     2:05 PM 1/31/2022     3:05 PM 6/3/2024     2:16 PM 5/5/2025     3:31 PM   Vitals   Systolic 127 115 144 147   Diastolic 76 66 82 88   Heart Rate 100 91 105 101   Temp  36.8 °C (98.3 " "°F) 36.3 °C (97.3 °F) 36.2 °C (97.2 °F)   Height   1.664 m (5' 5.5\") 1.651 m (5' 5\")   Weight (lb) 109.4 110.6 124.25 133.13   BMI   20.36 kg/m2 22.15 kg/m2   BSA (m2)   1.61 m2 1.66 m2   Visit Report   Report Report      Current Medications:  Current Medications[1]     Assessment:   Kevin Mayorga is a 19 y.o. male (assigned male at birth) presenting for follow-up.     Diagnosis:   1. Other specified persistent mood disorders  Follow Up In Pediatric Psychiatry    QUEtiapine (SEROquel) 100 mg tablet    Follow Up In Pediatric Psychiatry    traZODone (Desyrel) 50 mg tablet    DISCONTINUED: QUEtiapine (SEROquel) 100 mg tablet    DISCONTINUED: traZODone (Desyrel) 50 mg tablet      2. Attention deficit hyperactivity disorder (ADHD), combined type  Follow Up In Pediatric Psychiatry    dexmethylphenidate XR (Focalin XR) 40 mg 24 hr capsule    Follow Up In Pediatric Psychiatry      3. Autism ([facility])  Follow Up In Pediatric Psychiatry    Follow Up In Pediatric Psychiatry      4. ADHD (attention deficit hyperactivity disorder), combined type  Follow Up In Pediatric Psychiatry    dexmethylphenidate XR (Focalin XR) 40 mg 24 hr capsule    dexmethylphenidate XR (Focalin XR) 40 mg 24 hr capsule    dexmethylphenidate XR (Focalin XR) 40 mg 24 hr capsule    Follow Up In Pediatric Psychiatry        Assessment/Plan   As of 05/05/2025:   increase quetiapine to 150 mg - sleep is indicator over the years of worsening mood  cont. Focalin and Trazodone  encourage strongly leaving room every day, most of the day, during the day  encourage increased demands on pt.'s activity    Follow-up:   Follow up 8.2025 or sooner as needed.   Most recent in-person visit 5.5.25        Bree Ballard MD         [1]   Current Outpatient Medications:     benzoyl peroxide 5 % external wash, Apply topically., Disp: , Rfl:     dexmethylphenidate XR (Focalin XR) 40 mg 24 hr capsule, Take 1 capsule (40 mg) by mouth once daily. Do not crush, chew, or split., Disp: " 30 capsule, Rfl: 0    [START ON 6/30/2025] dexmethylphenidate XR (Focalin XR) 40 mg 24 hr capsule, Take 1 capsule (40 mg) by mouth once daily for 10 days. Do not crush, chew, or split. Do not fill before June 30, 2025., Disp: 10 capsule, Rfl: 0    [START ON 6/2/2025] dexmethylphenidate XR (Focalin XR) 40 mg 24 hr capsule, Take 1 capsule (40 mg) by mouth once daily. Do not crush, chew, or split. Do not fill before June 2, 2025., Disp: 30 capsule, Rfl: 0    methylphenidate CD (Metadate CD) 60 mg daily capsule, Take 1 capsule (60 mg) by mouth once daily in the morning. Take with 30 mg capsule Do not fill before October 14, 2024., Disp: 30 capsule, Rfl: 0    methylphenidate CD (Metadate CD) 60 mg daily capsule, Take 1 capsule (60 mg) by mouth once daily in the morning. Do not crush or chew. Do not fill before November 11, 2024., Disp: 30 capsule, Rfl: 0    methylphenidate CD (Metadate CD) 60 mg daily capsule, Take 1 capsule (60 mg) by mouth once daily in the morning. Do not crush or chew. Do not fill before February 3, 2025., Disp: 30 capsule, Rfl: 0    methylphenidate CD (Metadate CD) 60 mg daily capsule, Take 1 capsule (60 mg) by mouth once daily in the morning. Do not crush or chew. Do not fill before March 3, 2025., Disp: 30 capsule, Rfl: 0    methylphenidate CD (Metadate CD) 60 mg daily capsule, Take 1 capsule (60 mg) by mouth once daily in the morning for 10 days., Disp: 10 capsule, Rfl: 0    QUEtiapine (SEROquel) 100 mg tablet, Take 1.5 tablets (150 mg) by mouth once daily., Disp: 90 tablet, Rfl: 2    traZODone (Desyrel) 50 mg tablet, Take 1 tablet (50 mg) by mouth once daily at bedtime., Disp: 90 tablet, Rfl: 2    triamcinolone (Kenalog) 0.5 % cream, Apply topically., Disp: , Rfl:

## 2025-07-14 ENCOUNTER — TELEPHONE (OUTPATIENT)
Dept: BEHAVIORAL HEALTH | Facility: CLINIC | Age: 20
End: 2025-07-14
Payer: COMMERCIAL

## 2025-07-14 ENCOUNTER — DOCUMENTATION (OUTPATIENT)
Dept: BEHAVIORAL HEALTH | Facility: HOSPITAL | Age: 20
End: 2025-07-14
Payer: COMMERCIAL

## 2025-07-14 DIAGNOSIS — F34.89 OTHER SPECIFIED PERSISTENT MOOD DISORDERS: Primary | ICD-10-CM

## 2025-07-14 DIAGNOSIS — F34.89 OTHER SPECIFIED PERSISTENT MOOD DISORDERS: ICD-10-CM

## 2025-07-14 RX ORDER — TRAZODONE HYDROCHLORIDE 50 MG/1
50 TABLET ORAL NIGHTLY
Qty: 90 TABLET | Refills: 2 | Status: CANCELLED | OUTPATIENT
Start: 2025-07-14

## 2025-07-14 RX ORDER — TRAZODONE HYDROCHLORIDE 100 MG/1
100 TABLET ORAL NIGHTLY
Qty: 90 TABLET | Refills: 0 | Status: SHIPPED | OUTPATIENT
Start: 2025-07-14 | End: 2025-10-12

## 2025-07-14 NOTE — PROGRESS NOTES
Covering MD:      Telephone contact with mother 7/14/25.  Patient has been taking 100 mg of Trazodone instead of 50 mg over the last few weeks per approval from Dr. Ballard due to 50 mg not helping adequately with sleep.  100 mg is working well.  Rx for Trazodone 100 mg , take one every evening, #90 sent to [facility]  Appointment for next month scheduled with Dr. Ballard.

## 2025-07-29 DIAGNOSIS — F90.2 ADHD (ATTENTION DEFICIT HYPERACTIVITY DISORDER), COMBINED TYPE: ICD-10-CM

## 2025-07-29 DIAGNOSIS — F90.2 ATTENTION DEFICIT HYPERACTIVITY DISORDER (ADHD), COMBINED TYPE: ICD-10-CM

## 2025-07-31 DIAGNOSIS — F90.2 ADHD (ATTENTION DEFICIT HYPERACTIVITY DISORDER), COMBINED TYPE: ICD-10-CM

## 2025-07-31 RX ORDER — DEXMETHYLPHENIDATE HYDROCHLORIDE 40 MG/1
40 CAPSULE, EXTENDED RELEASE ORAL DAILY
Qty: 30 CAPSULE | Refills: 0 | Status: SHIPPED | OUTPATIENT
Start: 2025-09-25

## 2025-07-31 RX ORDER — DEXMETHYLPHENIDATE HYDROCHLORIDE 40 MG/1
40 CAPSULE, EXTENDED RELEASE ORAL DAILY
Qty: 30 CAPSULE | Refills: 0 | Status: SHIPPED | OUTPATIENT
Start: 2025-07-31 | End: 2025-08-30

## 2025-07-31 RX ORDER — DEXMETHYLPHENIDATE HYDROCHLORIDE 40 MG/1
40 CAPSULE, EXTENDED RELEASE ORAL DAILY
Qty: 30 CAPSULE | Refills: 0 | Status: SHIPPED | OUTPATIENT
Start: 2025-08-28 | End: 2025-09-27

## 2025-08-18 ENCOUNTER — APPOINTMENT (OUTPATIENT)
Dept: BEHAVIORAL HEALTH | Facility: CLINIC | Age: 20
End: 2025-08-18
Payer: COMMERCIAL

## 2025-08-18 DIAGNOSIS — F34.89 OTHER SPECIFIED PERSISTENT MOOD DISORDERS: ICD-10-CM

## 2025-08-18 DIAGNOSIS — F90.2 ATTENTION DEFICIT HYPERACTIVITY DISORDER (ADHD), COMBINED TYPE: ICD-10-CM

## 2025-08-18 DIAGNOSIS — F90.2 ADHD (ATTENTION DEFICIT HYPERACTIVITY DISORDER), COMBINED TYPE: ICD-10-CM

## 2025-08-18 DIAGNOSIS — F84.0 AUTISM (HHS-HCC): ICD-10-CM

## 2025-08-18 PROCEDURE — 99213 OFFICE O/P EST LOW 20 MIN: CPT | Performed by: PSYCHIATRY & NEUROLOGY

## 2025-08-18 RX ORDER — DEXMETHYLPHENIDATE HYDROCHLORIDE 40 MG/1
40 CAPSULE, EXTENDED RELEASE ORAL DAILY
Qty: 30 CAPSULE | Refills: 0 | Status: SHIPPED | OUTPATIENT
Start: 2025-08-18 | End: 2025-09-17

## 2025-08-18 RX ORDER — DEXMETHYLPHENIDATE HYDROCHLORIDE 40 MG/1
40 CAPSULE, EXTENDED RELEASE ORAL DAILY
Qty: 30 CAPSULE | Refills: 0 | Status: SHIPPED | OUTPATIENT
Start: 2025-10-13

## 2025-08-18 RX ORDER — DEXMETHYLPHENIDATE HYDROCHLORIDE 10 MG/1
10 TABLET ORAL
Qty: 30 TABLET | Refills: 0 | Status: SHIPPED | OUTPATIENT
Start: 2025-10-13 | End: 2025-11-12

## 2025-08-18 RX ORDER — DEXMETHYLPHENIDATE HYDROCHLORIDE 10 MG/1
10 TABLET ORAL
Qty: 30 TABLET | Refills: 0 | Status: SHIPPED | OUTPATIENT
Start: 2025-08-18 | End: 2025-09-17

## 2025-08-18 RX ORDER — QUETIAPINE FUMARATE 100 MG/1
150 TABLET, FILM COATED ORAL DAILY
Qty: 90 TABLET | Refills: 2 | Status: SHIPPED | OUTPATIENT
Start: 2025-08-18

## 2025-08-18 RX ORDER — DEXMETHYLPHENIDATE HYDROCHLORIDE 10 MG/1
10 TABLET ORAL
Qty: 30 TABLET | Refills: 0 | Status: SHIPPED | OUTPATIENT
Start: 2025-09-15 | End: 2025-10-15

## 2025-08-18 RX ORDER — DEXMETHYLPHENIDATE HYDROCHLORIDE 40 MG/1
40 CAPSULE, EXTENDED RELEASE ORAL DAILY
Qty: 30 CAPSULE | Refills: 0 | Status: SHIPPED | OUTPATIENT
Start: 2026-09-15 | End: 2026-10-15

## 2025-08-18 RX ORDER — TRAZODONE HYDROCHLORIDE 50 MG/1
50 TABLET ORAL NIGHTLY
Qty: 90 TABLET | Refills: 1 | Status: SHIPPED | OUTPATIENT
Start: 2025-08-18

## 2025-08-18 RX ORDER — TRAZODONE HYDROCHLORIDE 100 MG/1
100 TABLET ORAL NIGHTLY
Qty: 90 TABLET | Refills: 0 | Status: SHIPPED | OUTPATIENT
Start: 2025-08-18 | End: 2025-11-16

## 2025-11-17 ENCOUNTER — APPOINTMENT (OUTPATIENT)
Dept: BEHAVIORAL HEALTH | Facility: CLINIC | Age: 20
End: 2025-11-17
Payer: COMMERCIAL